# Patient Record
Sex: MALE | Race: BLACK OR AFRICAN AMERICAN
[De-identification: names, ages, dates, MRNs, and addresses within clinical notes are randomized per-mention and may not be internally consistent; named-entity substitution may affect disease eponyms.]

---

## 2018-10-19 ENCOUNTER — HOSPITAL ENCOUNTER (EMERGENCY)
Dept: HOSPITAL 47 - EC | Age: 43
LOS: 1 days | Discharge: HOME | End: 2018-10-20
Payer: COMMERCIAL

## 2018-10-19 VITALS
DIASTOLIC BLOOD PRESSURE: 74 MMHG | SYSTOLIC BLOOD PRESSURE: 138 MMHG | HEART RATE: 68 BPM | RESPIRATION RATE: 16 BRPM | TEMPERATURE: 98 F

## 2018-10-19 DIAGNOSIS — J45.909: ICD-10-CM

## 2018-10-19 DIAGNOSIS — M20.12: ICD-10-CM

## 2018-10-19 DIAGNOSIS — B07.0: Primary | ICD-10-CM

## 2018-10-19 DIAGNOSIS — F17.200: ICD-10-CM

## 2018-10-19 PROCEDURE — 99283 EMERGENCY DEPT VISIT LOW MDM: CPT

## 2018-10-19 NOTE — XR
EXAMINATION TYPE: XR foot complete LT

 

DATE OF EXAM: 10/19/2018

 

COMPARISON: NONE

 

HISTORY: Pain

 

TECHNIQUE: 3 views

 

FINDINGS: There is moderate hallux valgus. Metatarsals are intact. I see no fracture nor dislocation.


 

IMPRESSION: Hallux valgus. No fracture seen.

## 2018-10-20 NOTE — ED
Skin/Abscess/FB HPI





- General


Source: patient


Mode of arrival: ambulatory


Limitations: no limitations





<Terrie Barcenas - Last Filed: 10/20/18 05:17>





<Tricia Marsh - Last Filed: 10/20/18 06:23>





- General


Chief complaint: Skin/Abscess/Foreign Body


Stated complaint: foot infection


Time Seen by Provider: 10/19/18 23:06





- History of Present Illness


Initial comments: 


43-year-old male patient presents to the emergency department today for 

evaluation of lesion to the left heel.  Patient states that he noticed lesion 

week or 2 ago.  States it is painful any steps down on a.  States it is hard.  

He denies any drainage, redness, or warmth to the area.  Denies any fevers or 

chills.  Denies any known injury to the foot.  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 

 and does wear the same boots every day.  He is not sure if there may be 

a foreign body to the foot.  Denies any history of diabetes. Patient denies any 

headache, neck pain, back pain, chest pain, shortness of breath, dizziness, 

weakness, abdominal pain, nausea, vomiting, or difficulties with bowel 

movements or urination.


 (Terrie Barcenas)





- Related Data


 Home Medications











 Medication  Instructions  Recorded  Confirmed


 


Albuterol Inhaler [Ventolin Hfa 1 - 2 puff INHALATION RT-Q6H PRN 10/19/18 10/19/

18





Inhaler]   


 


Doxycycline Hyclate 100 mg PO DAILY 10/19/18 10/19/18











 Allergies











Allergy/AdvReac Type Severity Reaction Status Date / Time


 


No Known Allergies Allergy   Unverified 10/19/18 22:57














Review of Systems


ROS Other: All systems not noted in ROS Statement are negative.





<Terrie Barcenas - Last Filed: 10/20/18 05:17>


ROS Other: All systems not noted in ROS Statement are negative.





<Tricia Marsh - Last Filed: 10/20/18 06:23>


ROS Statement: 


Those systems with pertinent positive or pertinent negative responses have been 

documented in the HPI.








Past Medical History


Past Medical History: Asthma


History of Any Multi-Drug Resistant Organisms: None Reported


Past Surgical History: No Surgical Hx Reported


Past Psychological History: No Psychological Hx Reported


Smoking Status: Current every day smoker


Past Alcohol Use History: None Reported


Past Drug Use History: None Reported





<Terrie Barcenas M - Last Filed: 10/20/18 05:17>





General Exam


Limitations: no limitations


General appearance: alert, in no apparent distress, other (This is a well-

developed, well-nourished adult male patient in no acute distress.  Vital signs 

upon presentation are temperature 98.0F, pulse 68, respirations 16, blood 

pressure 138/74, pulse ox 98% on room air.)


Respiratory exam: Present: normal lung sounds bilaterally.  Absent: respiratory 

distress, wheezes, rales, rhonchi, stridor


Cardiovascular Exam: Present: regular rate, normal rhythm, normal heart sounds.

  Absent: systolic murmur, diastolic murmur, rubs, gallop, clicks


Extremities exam: Present: full ROM, normal capillary refill, other (Patient 

has hard, raised, scaly lesion with a central blackened area to the left heel.  

Area is tender to touch.  No surrounding erythema or drainage.  Skin is 

otherwise warm, dry, cap refill less than 3 seconds.  Pedal pulse 2+ and equal 

bilaterally.).  Absent: normal inspection, tenderness, pedal edema, joint 

swelling, calf tenderness


Neurological exam: Present: alert, oriented X3, CN II-XII intact


Psychiatric exam: Present: normal affect, normal mood


Skin exam: Present: warm, dry, intact, normal color.  Absent: rash





<Terrie Barcenas M - Last Filed: 10/20/18 05:17>





 Vital Signs











  10/19/18 10/20/18





  22:40 00:05


 


Temperature 98.0 F 


 


Pulse Rate 68 


 


Respiratory 16 16





Rate  


 


Blood Pressure 138/74 


 


O2 Sat by Pulse 98 





Oximetry  














Medical Decision Making





- Radiology Data


Radiology results: report reviewed, image reviewed





<Terrie Barcenas M - Last Filed: 10/20/18 05:17>





<Tricia Marsh - Last Filed: 10/20/18 06:23>





- Medical Decision Making


43-year-old male patient presents emergency parents today for evaluation of 

lesion to the left heel.  Physical examination did reveal a hard, raised, scaly 

lesion with central blackened area.  This is consistent with plantar wart.  We 

did obtain x-ray of the foot to rule out foreign body.  No foreign body was 

noted.  Did discuss findings with the patient.  He is instructed to follow-up 

with podiatry or dermatology for further evaluation and management of the 

report.  He is also instructed to purchase over-the-counter wart relief 

medication if he desires.  Return parameters discussed in detail.  He 

verbalizes understanding and agrees with this plan. (Terrie Barcenas)


I was available for consultation in the emergency department. The history and 

physical exam were done by the midlevel provider.  I was consulted for this 

patient's care.  I reviewed the case with the midlevel provider and based on 

their presentation of the patient, I agree with the assessment, medical 

decision making and plan of care as documented.


 (Tricia Marsh)





- Radiology Data


3 views of the left foot are obtained.  There is moderate hallux valgus.  

Metatarsals are intact rate is no fracture or dislocation.  Impression by Dr. Miller shows hallux valgus.  No fracture seen.  No evidence of foreign body. 

(Terrie Barcenas)





Disposition


Is patient prescribed a controlled substance at d/c from ED?: No


Time of Disposition: 23:59





<Terrie Barcenas - Last Filed: 10/20/18 05:17>





<Tricia Marsh - Last Filed: 10/20/18 06:23>


Clinical Impression: 


 Plantar wart





Disposition: HOME SELF-CARE


Condition: Good


Instructions:  Plantar Wart (ED)


Additional Instructions: 


Use over-the-counter plantar wart medication or follow-up with dermatology/

podiatry for further evaluation and possible removal.  Take Tylenol Motrin for 

pain control.  Return immediately for any new, worsening, or concerning 

symptoms.


Referrals: 


Klaus Snow MD [STAFF PHYSICIAN] - 1-2 days


Hugo Grey DPM [STAFF PHYSICIAN] - 1-2 days